# Patient Record
Sex: MALE
[De-identification: names, ages, dates, MRNs, and addresses within clinical notes are randomized per-mention and may not be internally consistent; named-entity substitution may affect disease eponyms.]

---

## 2019-12-12 PROBLEM — Z00.00 ENCOUNTER FOR PREVENTIVE HEALTH EXAMINATION: Status: ACTIVE | Noted: 2019-12-12

## 2019-12-23 ENCOUNTER — APPOINTMENT (OUTPATIENT)
Dept: NEUROLOGY | Facility: CLINIC | Age: 34
End: 2019-12-23
Payer: COMMERCIAL

## 2019-12-23 VITALS
HEART RATE: 76 BPM | DIASTOLIC BLOOD PRESSURE: 85 MMHG | HEIGHT: 71 IN | WEIGHT: 202 LBS | SYSTOLIC BLOOD PRESSURE: 120 MMHG | TEMPERATURE: 98.7 F | BODY MASS INDEX: 28.28 KG/M2 | OXYGEN SATURATION: 98 %

## 2019-12-23 PROCEDURE — 99205 OFFICE O/P NEW HI 60 MIN: CPT

## 2019-12-23 NOTE — DISCUSSION/SUMMARY
[FreeTextEntry1] : 35 y/o male single with a hx of GTC sz x times and a hx of febrile seizures. Likely has IGE given the hx but needs confirmation\par

## 2019-12-23 NOTE — ASSESSMENT
[FreeTextEntry1] : REEG\par 24 hs EEG \par Continue Keppra\par Will likely switch to another AED once EEG done. Consider TOP, ZNG or Fycompa\par \par Treatment, side effects and dosages were discussed in detail.\par \par Risk of allergic reactions, mood changes, lab etc discussed in details.\par \par Seizure precautions discussed in detail including restrictions on driving, machinery, etc. \par \par Seizure triggers including ETOH intake, drugs discussed in detail\par \par High risk of SUDEP discussed\par \par Patient questions answered.\par

## 2019-12-23 NOTE — DATA REVIEWED
[No studies available for review at this time.] : No studies available for review at this time. [de-identified] : Probably IGE pattern

## 2019-12-23 NOTE — HISTORY OF PRESENT ILLNESS
[FreeTextEntry1] : This is the first visit of this 34-year-old right-handed single male from [place] we've been living in [place] for several years was consulting for seizures\par \par The past medical history is of interest. He had febrile convulsions since age of 1.5 years of duty he was about 7 years old. His parents report that he had about one seizure every year. He was not treated at the time.\par \par At age 11 or so he had an episode of what appears to be confusion and daydreaming. He was found at the door step over a house apparently after missing his boss. At about the age of 13 he was diagnosed with possible ADD and underwent an EEG during sleep. The EEG apparently was abnormal but do provide that he had no seizures he was not start a medication.\par At age 19 after sleep deprivation and working out he had a major generalized tonic-clonic seizure. He was admitted to the hospital and after a workup he started on medication which she took regularly for about a year but then stopped taking it. About 5 years later at around the age of 24 he had another episode after sleep deprivation when he was confuse and had a generalized tonic-clonic seizure. Again here started the medication but for some reason stopped taking it in 2017.\par Every one of these events was apparently associated with drinking or sleep deprivation as well as substance abuse.\par He apparently was well until about a month ago when he had a GTC seizure again. He had also gone partying all night and use some substance abuse. He slept 2 hours and then drove back to drooping. At 7 PM or so he was found to have naked in the street and eventually was admitted to the hospital where he had a second generalized seizure. He is started the medications again. On December 21, 2019 he had another generalized tonic-clonic seizure. There were no witnesses. He woke up Sunday a.m. and noted that he had it for head injury as well as tongue injury. He had forgotten to take his Keppra for 2 days and also again had been drinking and not sleeping.\par \par His workup was partially done in [place]. Some records were reviewed. There is an EEG from 2011 that appeared to be show generalized spike-wave discharges. he was treated with apparently Depakote initially.

## 2019-12-26 ENCOUNTER — TRANSCRIPTION ENCOUNTER (OUTPATIENT)
Age: 34
End: 2019-12-26

## 2019-12-26 ENCOUNTER — OTHER (OUTPATIENT)
Age: 34
End: 2019-12-26

## 2019-12-27 ENCOUNTER — TRANSCRIPTION ENCOUNTER (OUTPATIENT)
Age: 34
End: 2019-12-27

## 2019-12-30 ENCOUNTER — APPOINTMENT (OUTPATIENT)
Dept: NEUROLOGY | Facility: CLINIC | Age: 34
End: 2019-12-30
Payer: COMMERCIAL

## 2019-12-30 PROCEDURE — 95816 EEG AWAKE AND DROWSY: CPT

## 2019-12-31 ENCOUNTER — APPOINTMENT (OUTPATIENT)
Dept: NEUROLOGY | Facility: CLINIC | Age: 34
End: 2019-12-31

## 2019-12-31 PROCEDURE — 95953: CPT

## 2020-01-13 ENCOUNTER — APPOINTMENT (OUTPATIENT)
Dept: NEUROLOGY | Facility: CLINIC | Age: 35
End: 2020-01-13
Payer: MEDICAID

## 2020-01-13 VITALS
BODY MASS INDEX: 28.84 KG/M2 | HEART RATE: 88 BPM | TEMPERATURE: 98 F | WEIGHT: 206 LBS | DIASTOLIC BLOOD PRESSURE: 86 MMHG | SYSTOLIC BLOOD PRESSURE: 124 MMHG | HEIGHT: 71 IN | OXYGEN SATURATION: 97 %

## 2020-01-13 PROCEDURE — 99215 OFFICE O/P EST HI 40 MIN: CPT

## 2020-01-13 NOTE — ASSESSMENT
[FreeTextEntry1] : Epilepsy (345.90) (G40.909)\par \par Start 2mg fycompa for 2 weeks. Then increase to 4 mg of fycompa. After being on 4mg of fycompa do an amb EEG and if am EEG has improved slowly taper Keppra. Continue Keppra for now. \par \par Follow up once returning from [facility] in 2 months. \par \par Treatment, side effects and dosages were discussed in detail.\par \par Risk of allergic reactions, mood changes, lab etc discussed in details.\par \par Seizure precautions discussed in detail including restrictions on driving, machinery, etc. \par \par Seizure triggers including ETOH intake, drugs discussed in detail\par \par High risk of SUDEP discussed\par \par Patient questions answered.

## 2020-01-13 NOTE — HISTORY OF PRESENT ILLNESS
[FreeTextEntry1] : Reese is here for a follow up visit. He is 34-year-old right-handed single male from [place]. Has had 4-5 seizures in his life time not including febrile seizures. \par \par Amb EEG reviewed with shows occasional bursts of diffuse, high amplitude polymorphic delta activity with intermixed bifrontal maximal spikes and mild generalized background slowing. \par \par Cuurent meds:\par Keppra 500 mg Am and 1000 mg PM.\par \par Prior hx: This is the first visit of this 34-year-old right-handed single male from [place] we've been living in [place] for several years was consulting for seizures.\par \par The past medical history is of interest. He had febrile convulsions since age of 1[age]5 years of duty he was about 7 years old. His parents report that he had about one seizure every year. He was not treated at the time.\par \par At age 11 or so he had an episode of what appears to be confusion and daydreaming. He was found at the door step over a house apparently after missing his boss. At about the age of 13 he was diagnosed with possible ADD and underwent an EEG during sleep. The EEG apparently was abnormal but do provide that he had no seizures he was not start a medication.\par At age 19 after sleep deprivation and working out he had a major generalized tonic-clonic seizure. He was admitted to the hospital and after a workup he started on medication which she took regularly for about a year but then stopped taking it. About 5 years later at around the age of 24 he had another episode after sleep deprivation when he was confuse and had a generalized tonic-clonic seizure. Again here started the medication but for some reason stopped taking it in 2017.\par Every one of these events was apparently associated with drinking or sleep deprivation as well as substance abuse.\par He apparently was well until about a month ago when he had a GTC seizure again. He had also gone partying all night and use some substance abuse. He slept 2 hours and then drove back to drooping. At 7 PM or so he was found to have naked in the street and eventually was admitted to the hospital where he had a second generalized seizure. He is started the medications again. On December 21, 2019 he had another generalized tonic-clonic seizure. There were no witnesses. He woke up Sunday a.m. and noted that he had it for head injury as well as tongue injury. He had forgotten to take his Keppra for 2 days and also again had been drinking and not sleeping.\par \par His workup was partially done in [place]. Some records were reviewed. There is an EEG from 2011 that appeared to be show generalized spike-wave discharges. he was treated with apparently Depakote initially. \par

## 2020-01-13 NOTE — PHYSICAL EXAM
[FreeTextEntry1] : General:\par Constitutional:  Sitting comfortably in NAD.\par Psychiatric: well-groomed, appropriate affect, no lability.\par Ears, Nose, Throat: no abnormalities, mucus membranes moist\par Neck: supple, no lymphadenopathy\par Extremities: no edema, clubbing or cyanosis. Toes have major abrasions. \par Skin: no rash or neuro-cutaneous signs \par \par Cognitive:\par Orientation, language, memory and knowledge screens intact.\par Cranial Nerves:\par II: OSKAR. III/IV/VI: EOM Full.  Absent nystagmus\par V1V2V3: Symmetric, VII: Face appears symmetric, IX/X: Voice intact  XI: Trapezius Symmetric  XII: Tongue midline\par Motor:  Power: no pronator drift, power 5/5 distal U/E\par No tremor\par Coordination/Gait:\par No U/E ataxia on screening \par Narrow based gait\par \par Reflexes:\par DTR: 2+ symmetric all 4 limbs\par \par

## 2020-01-13 NOTE — REVIEW OF SYSTEMS
[Depression] : depression [Seizures] : convulsions [de-identified] : convulsion [FreeTextEntry1] : Constitutional, Neurological, Eyes, ENT, Cardiovascular, Respiratory and Gastrointestinal are otherwise negative. \par  Constitutional, Neurological, Eyes, ENT, Cardiovascular, Respiratory and Gastrointestinal are otherwise negative.

## 2020-01-13 NOTE — DISCUSSION/SUMMARY
[FreeTextEntry1] : 35 y/o male single with a hx of GTC sz x times and a hx of febrile seizures. \par Has IGE as shown by amb EEG.

## 2020-04-21 NOTE — DISCUSSION/SUMMARY
[FreeTextEntry1] : having HA x 2-3 weeks.\par On Fycompa 4 mg and Keppra 1500 mg BID\par \par HA frontal bilateral\par Not recall if at 2 mg had HA

## 2020-05-08 ENCOUNTER — RX RENEWAL (OUTPATIENT)
Age: 35
End: 2020-05-08

## 2020-07-13 ENCOUNTER — RX RENEWAL (OUTPATIENT)
Age: 35
End: 2020-07-13

## 2020-07-15 ENCOUNTER — TRANSCRIPTION ENCOUNTER (OUTPATIENT)
Age: 35
End: 2020-07-15

## 2020-07-17 ENCOUNTER — APPOINTMENT (OUTPATIENT)
Dept: ORTHOPEDIC SURGERY | Facility: CLINIC | Age: 35
End: 2020-07-17

## 2020-07-31 ENCOUNTER — APPOINTMENT (OUTPATIENT)
Dept: NEUROLOGY | Facility: CLINIC | Age: 35
End: 2020-07-31
Payer: MEDICAID

## 2020-07-31 PROCEDURE — 95816 EEG AWAKE AND DROWSY: CPT

## 2020-08-03 ENCOUNTER — RX RENEWAL (OUTPATIENT)
Age: 35
End: 2020-08-03

## 2020-08-28 ENCOUNTER — RX RENEWAL (OUTPATIENT)
Age: 35
End: 2020-08-28

## 2020-11-09 ENCOUNTER — RX RENEWAL (OUTPATIENT)
Age: 35
End: 2020-11-09

## 2020-12-09 ENCOUNTER — APPOINTMENT (OUTPATIENT)
Dept: NEUROLOGY | Facility: CLINIC | Age: 35
End: 2020-12-09
Payer: MEDICAID

## 2020-12-09 PROCEDURE — 99214 OFFICE O/P EST MOD 30 MIN: CPT | Mod: 95

## 2020-12-09 RX ORDER — PERAMPANEL 2 MG/1
2 TABLET ORAL
Qty: 60 | Refills: 0 | Status: DISCONTINUED | COMMUNITY
Start: 2020-01-13 | End: 2020-12-09

## 2020-12-09 RX ORDER — PERAMPANEL 2 MG/1
2 TABLET ORAL
Qty: 14 | Refills: 0 | Status: DISCONTINUED | COMMUNITY
Start: 2020-01-21 | End: 2020-12-09

## 2020-12-09 NOTE — DISCUSSION/SUMMARY
[FreeTextEntry1] : 34 y/o male with IGE.\par Will like to reduce Keppra given that he is doing well on Fycompa 4 mg/day

## 2020-12-09 NOTE — HISTORY OF PRESENT ILLNESS
[Home] : at home, [unfilled] , at the time of the visit. [Medical Office: ([facility])___] : at the medical office located in  [Verbal consent obtained from patient] : the patient, [unfilled] [FreeTextEntry1] : Follow p\par 35-year-old right-handed single male from [place] we've been living in [place] for several years was consulting for seizures\par \par The past medical history is of interest. He had febrile convulsions since age of 1.5 years of duty he was about 7 years old. His parents report that he had about one seizure every year. He was not treated at the time.\par \par At age 11 or so he had an episode of what appears to be confusion and daydreaming. He was found at the door step over a house apparently after missing his boss. At about the age of 13 he was diagnosed with possible ADD and underwent an EEG during sleep. The EEG apparently was abnormal but do provide that he had no seizures he was not start a medication.\par At age 19 after sleep deprivation and working out he had a major generalized tonic-clonic seizure. He was admitted to the hospital and after a workup he started on medication which she took regularly for about a year but then stopped taking it. About 5 years later at around the age of 24 he had another episode after sleep deprivation when he was confuse and had a generalized tonic-clonic seizure. Again here started the medication but for some reason stopped taking it in 2017.\par Every one of these events was apparently associated with drinking or sleep deprivation as well as substance abuse.\par He apparently was well until about a month ago when he had a GTC seizure again. He had also gone partying all night and use some substance abuse. He slept 2 hours and then drove back to drooping. At 7 PM or so he was found to have naked in the street and eventually was admitted to the hospital where he had a second generalized seizure. He is started the medications again. On December 21, 2019 he had another generalized tonic-clonic seizure. There were no witnesses. He woke up Sunday a.m. and noted that he had it for head injury as well as tongue injury. He had forgotten to take his Keppra for 2 days and also again had been drinking and not sleeping.\par \par His workup was partially done in [place]. Some records were reviewed. There is an EEG from 2011 that appeared to be show generalized spike-wave discharges. he was treated with apparently Depakote initially. \par \par HPI\par Moved to [place]. \par Sz are in good control. No seizures\par \par Fycompa 4 mg QHS\par Keppra 500 mg am and 1000 mg day\par \par No side effects\par

## 2020-12-09 NOTE — ASSESSMENT
[FreeTextEntry1] : Reduce Keppra to 500 mg BID\par Discussed risk of reducing meds and driving. Told pat that he should stop driving if he reduces the meds for safety for a few weeks at least. \par \par Patient will proceed

## 2021-02-04 ENCOUNTER — APPOINTMENT (OUTPATIENT)
Dept: NEUROLOGY | Facility: CLINIC | Age: 36
End: 2021-02-04

## 2021-02-12 ENCOUNTER — RX RENEWAL (OUTPATIENT)
Age: 36
End: 2021-02-12

## 2021-03-15 ENCOUNTER — RX RENEWAL (OUTPATIENT)
Age: 36
End: 2021-03-15

## 2021-04-10 ENCOUNTER — RX RENEWAL (OUTPATIENT)
Age: 36
End: 2021-04-10

## 2021-05-11 ENCOUNTER — RX RENEWAL (OUTPATIENT)
Age: 36
End: 2021-05-11

## 2021-06-17 ENCOUNTER — RX RENEWAL (OUTPATIENT)
Age: 36
End: 2021-06-17

## 2021-07-15 ENCOUNTER — RX RENEWAL (OUTPATIENT)
Age: 36
End: 2021-07-15

## 2021-08-16 ENCOUNTER — RX RENEWAL (OUTPATIENT)
Age: 36
End: 2021-08-16

## 2021-09-17 ENCOUNTER — RX RENEWAL (OUTPATIENT)
Age: 36
End: 2021-09-17

## 2021-10-18 ENCOUNTER — RX RENEWAL (OUTPATIENT)
Age: 36
End: 2021-10-18

## 2021-11-15 ENCOUNTER — RX RENEWAL (OUTPATIENT)
Age: 36
End: 2021-11-15

## 2021-11-18 RX ORDER — LEVETIRACETAM 500 MG/1
500 TABLET, FILM COATED ORAL
Qty: 90 | Refills: 4 | Status: ACTIVE | COMMUNITY
Start: 2019-12-23 | End: 1900-01-01

## 2021-11-18 RX ORDER — PERAMPANEL 4 MG/1
4 TABLET ORAL
Qty: 30 | Refills: 3 | Status: ACTIVE | COMMUNITY
Start: 2020-01-21 | End: 1900-01-01

## 2021-12-16 ENCOUNTER — APPOINTMENT (OUTPATIENT)
Dept: NEUROLOGY | Facility: CLINIC | Age: 36
End: 2021-12-16
Payer: MEDICAID

## 2021-12-16 DIAGNOSIS — G40.909 EPILEPSY, UNSPECIFIED, NOT INTRACTABLE, W/OUT STATUS EPILEPTICUS: ICD-10-CM

## 2021-12-16 PROCEDURE — 99214 OFFICE O/P EST MOD 30 MIN: CPT | Mod: 95

## 2021-12-16 RX ORDER — PERAMPANEL 8 MG/1
8 TABLET ORAL DAILY
Qty: 30 | Refills: 5 | Status: ACTIVE | COMMUNITY
Start: 2020-01-21 | End: 1900-01-01

## 2021-12-16 RX ORDER — LEVETIRACETAM 500 MG/1
500 TABLET, FILM COATED ORAL
Qty: 90 | Refills: 0 | Status: ACTIVE | COMMUNITY
Start: 2021-10-18 | End: 1900-01-01

## 2021-12-16 NOTE — HISTORY OF PRESENT ILLNESS
[Home] : at home, [unfilled] , at the time of the visit. [Medical Office: ([facility])___] : at the medical office located in  [Verbal consent obtained from patient] : the patient, [unfilled] [FreeTextEntry1] : Follow \par \par 36-year-old right-handed single male from [place] we've been living in [place] for several years was consulting for seizures\par \par The past medical history is of interest. He had febrile convulsions since age of 1[age]5 years of duty he was about 7 years old. His parents report that he had about one seizure every year. He was not treated at the time.\par \par At age 11 or so he had an episode of what appears to be confusion and daydreaming. He was found at the door step over a house apparently after missing his boss. At about the age of 13 he was diagnosed with possible ADD and underwent an EEG during sleep. The EEG apparently was abnormal but do provide that he had no seizures he was not start a medication.\par At age 19 after sleep deprivation and working out he had a major generalized tonic-clonic seizure. He was admitted to the hospital and after a workup he started on medication which she took regularly for about a year but then stopped taking it. About 5 years later at around the age of 24 he had another episode after sleep deprivation when he was confuse and had a generalized tonic-clonic seizure. Again here started the medication but for some reason stopped taking it in 2017.\par Every one of these events was apparently associated with drinking or sleep deprivation as well as substance abuse.\par He apparently was well until about a month ago when he had a GTC seizure again. He had also gone partying all night and use some substance abuse. He slept 2 hours and then drove back to drooping. At 7 PM or so he was found to have naked in the street and eventually was admitted to the hospital where he had a second generalized seizure. He is started the medications again. On December 21, 2019 he had another generalized tonic-clonic seizure. There were no witnesses. He woke up Sunday a.m. and noted that he had it for head injury as well as tongue injury. He had forgotten to take his Keppra for 2 days and also again had been drinking and not sleeping.\par \par His workup was partially done in [place]. Some records were reviewed. There is an EEG from 2011 that appeared to be show generalized spike-wave discharges. he was treated with apparently Depakote initially. \par \par HPI\par \par Moved to [place]. \par Sz are in good control. No seizures. \par \par Fycompa 4 mg QHS but feels that when he takes higher doses he is better\par Keppra 500 mg am and 1000 mg day\par \par No side effects\par \par

## 2021-12-16 NOTE — PHYSICAL EXAM
[Span Intact] : the attention span was normal [Naming Objects] : no difficulty naming common objects [Fluency] : fluency intact [Cranial Nerves Optic (II)] : visual acuity intact bilaterally,  visual fields full to confrontation, pupils equal round and reactive to light [Cranial Nerves Facial (VII)] : face symmetrical [Motor Handedness Right-Handed] : the patient is right hand dominant